# Patient Record
Sex: FEMALE | Race: ASIAN
[De-identification: names, ages, dates, MRNs, and addresses within clinical notes are randomized per-mention and may not be internally consistent; named-entity substitution may affect disease eponyms.]

---

## 2017-12-03 ENCOUNTER — HEALTH MAINTENANCE LETTER (OUTPATIENT)
Age: 47
End: 2017-12-03

## 2019-05-21 ENCOUNTER — OFFICE VISIT (OUTPATIENT)
Dept: FAMILY MEDICINE | Facility: CLINIC | Age: 49
End: 2019-05-21
Payer: COMMERCIAL

## 2019-05-21 VITALS
TEMPERATURE: 98.4 F | HEART RATE: 91 BPM | SYSTOLIC BLOOD PRESSURE: 116 MMHG | HEIGHT: 61 IN | BODY MASS INDEX: 22.09 KG/M2 | OXYGEN SATURATION: 97 % | DIASTOLIC BLOOD PRESSURE: 77 MMHG | WEIGHT: 117 LBS

## 2019-05-21 DIAGNOSIS — Z23 NEED FOR PROPHYLACTIC VACCINATION AGAINST DIPHTHERIA AND TETANUS: ICD-10-CM

## 2019-05-21 DIAGNOSIS — Z13.220 SCREENING CHOLESTEROL LEVEL: ICD-10-CM

## 2019-05-21 DIAGNOSIS — Z13.21 ENCOUNTER FOR VITAMIN DEFICIENCY SCREENING: ICD-10-CM

## 2019-05-21 DIAGNOSIS — J30.9 ALLERGIC RHINITIS, UNSPECIFIED SEASONALITY, UNSPECIFIED TRIGGER: Primary | ICD-10-CM

## 2019-05-21 DIAGNOSIS — Z13.1 SCREENING FOR DIABETES MELLITUS: ICD-10-CM

## 2019-05-21 LAB
CHOLEST SERPL-MCNC: 181 MG/DL (ref 0–200)
CHOLEST/HDLC SERPL: 3.8 {RATIO} (ref 0–5)
FASTING SPECIMEN: NO
HBA1C MFR BLD: 5.3 % (ref 4.1–5.7)
HDLC SERPL-MCNC: 48 MG/DL
LDLC SERPL CALC-MCNC: 111 MG/DL (ref 0–129)
TRIGL SERPL-MCNC: 113 MG/DL (ref 0–150)
VLDL-CHOLESTEROL: 23 (ref 7–32)

## 2019-05-21 RX ORDER — FLUTICASONE PROPIONATE 50 MCG
1 SPRAY, SUSPENSION (ML) NASAL 2 TIMES DAILY
Qty: 15.8 ML | Refills: 3 | Status: SHIPPED | OUTPATIENT
Start: 2019-05-21

## 2019-05-21 SDOH — HEALTH STABILITY: MENTAL HEALTH: HOW OFTEN DO YOU HAVE 6 OR MORE DRINKS ON ONE OCCASION?: NEVER

## 2019-05-21 SDOH — HEALTH STABILITY: MENTAL HEALTH: HOW OFTEN DO YOU HAVE A DRINK CONTAINING ALCOHOL?: MONTHLY OR LESS

## 2019-05-21 SDOH — HEALTH STABILITY: MENTAL HEALTH: HOW MANY STANDARD DRINKS CONTAINING ALCOHOL DO YOU HAVE ON A TYPICAL DAY?: 1 OR 2

## 2019-05-21 ASSESSMENT — PAIN SCALES - GENERAL: PAINLEVEL: NO PAIN (0)

## 2019-05-21 ASSESSMENT — MIFFLIN-ST. JEOR: SCORE: 1094.67

## 2019-05-21 NOTE — NURSING NOTE
"48 year old  Chief Complaint   Patient presents with     Establish Care     Throat Problem     felt something inside her throat, it can be dryness. It happened over a month.     Nasal Congestion       Blood pressure 116/77, pulse 91, temperature 98.4  F (36.9  C), temperature source Oral, height 1.552 m (5' 1.1\"), weight 53.1 kg (117 lb), last menstrual period 04/30/2019, SpO2 97 %. Body mass index is 22.03 kg/m .  Patient Active Problem List   Diagnosis     Disturbance of skin sensation       Wt Readings from Last 2 Encounters:   05/21/19 53.1 kg (117 lb)   08/19/16 53.6 kg (118 lb 2 oz)     BP Readings from Last 3 Encounters:   05/21/19 116/77   08/19/16 103/78         Current Outpatient Medications   Medication     Multiple Vitamin (MULTIVITAMINS PO)     Cyanocobalamin (B-12) 1000 MCG CAPS     No current facility-administered medications for this visit.        Social History     Tobacco Use     Smoking status: Never Smoker     Smokeless tobacco: Never Used   Substance Use Topics     Alcohol use: Yes     Comment: Several times a year     Drug use: No       Health Maintenance Due   Topic Date Due     PREVENTIVE CARE VISIT  1969     PAP  1969     HIV SCREENING  07/04/1984     DTAP/TDAP/TD IMMUNIZATION (1 - Tdap) 07/04/1994     PHQ-2  01/01/2019       No results found for: RITCHIE Rausch CMA  May 21, 2019 12:41 PM  "

## 2019-05-21 NOTE — LETTER
"                                      [facility]  [address]  [place] [address]  Phone: [phone]  Fax: [phone]       Date: May 22, 2019      Nick Stover  [address]  [place] [facility] [place] [address]        Nick,    Overall, your blood work looked very good.    Your Vitamin D level is normal (29 mcg/L).    Your blood sugar, measured by your hemoglobin A1c, was normal, 5.3%.    Your cholesterol is pretty good. Your total cholesterol (181) and LDL (111) were in a good range. Your HDL (the \"good\" cholesterol) could be a little bit higher than it currently is at 48. The best way to raise this level is through regular exercise, which I would recommend you start.    Please let me know if you have any questions.    It was good to meet you and your .    Nakul Patino  3:48 PM, May 22, 2019    Resulted Orders   Lipid Panel ([place])   Result Value Ref Range    FASTING SPECIMEN no     Cholesterol 181.0 0.0 - 200.0    HDL Cholesterol 48.0 (L) >50.0    Triglycerides 113.0 0.0 - 150.0    Cholesterol/HDL Ratio 3.8 0.0 - 5.0    LDL Cholesterol Direct 111.0 0.0 - 129.0    VLDL-Cholesterol 23.0 7.0 - 32.0   Hemoglobin A1c ([place])   Result Value Ref Range    Hemoglobin A1C 5.3 4.1 - 5.7 %   Vitamin D Deficiency   Result Value Ref Range    Vitamin D Deficiency screening 29 20 - 75 ug/L      Comment:      Season, race, dietary intake, and treatment affect the concentration of   25-hydroxy-Vitamin D. Values may decrease during winter months and increase   during summer months. Values 20-29 ug/L may indicate Vitamin D insufficiency   and values <20 ug/L may indicate Vitamin D deficiency.  Vitamin D determination is routinely performed by an immunoassay specific for   25 hydroxyvitamin D3.  If an individual is on vitamin D2 (ergocalciferol)   supplementation, please specify 25 OH vitamin D2 and D3 level determination by   LCMSMS test [id number].             "

## 2019-05-21 NOTE — PROGRESS NOTES
SUBJECTIVE:   Nick Escalona is a 48 year old female who presents to clinic today to establish care and to discuss the following problem(s).    - feeling that needs to clear her throat, itching in throat for the past 2 months  - white mucous from nose and when she spits  - feels that nose is dry  - very mild nasal congestion  - denies sinus pressure  - not as severe early in the morning, worse throughout the day  - no headaches  - no breathing difficulty and no coughing  - no rash/skin changes  - no difficulty swallowing solids or liquids  - not taking any medications for this    - has a pet cat for the past 2 years, has intermittent itchy eyes when clearing litter box    ROS: Denies fevers, chills, chest pain, difficulty breathing, abdominal pain    Past Medical History:   Diagnosis Date     Gestational diabetes      Past Surgical History:   Procedure Laterality Date      SECTION  2006    Placenta Previa     Family History   Problem Relation Age of Onset     Cancer Mother 25        Spleen Cancer     Gestational Diabetes Mother      Stomach Cancer Father 55     Asthma Paternal Grandmother      Heart Disease No family hx of      Breast Cancer No family hx of      Ovarian Cancer No family hx of      Colon Cancer No family hx of      Social History     Tobacco Use     Smoking status: Never Smoker     Smokeless tobacco: Never Used   Substance Use Topics     Alcohol use: Yes     Frequency: Monthly or less     Drinks per session: 1 or 2     Binge frequency: Never     Comment: Several times a year     Drug use: No     Social History     Social History Narrative    Spiritual/cultural practices: None specific        Support network: Friends from work,         Living situation: Lives with  and 2 daughters (one of whom is going to [employer] in the fall), she grew up in [place], moved to  [place] , her parents and siblings are still in [place]        Works in IT for [employer].       Current Outpatient  "Medications   Medication     fluticasone (FLONASE) 50 MCG/ACT nasal spray     Multiple Vitamin (MULTIVITAMINS PO)     No current facility-administered medications for this visit.      I have reviewed the patient's past medical, surgical, family, and social history.     OBJECTIVE:   /77 (BP Location: Left arm, Patient Position: Chair, Cuff Size: Adult Small)   Pulse 91   Temp 98.4  F (36.9  C) (Oral)   Ht 1.552 m (5' 1.1\")   Wt 53.1 kg (117 lb)   LMP 04/30/2019 (Approximate)   SpO2 97%   BMI 22.03 kg/m      Constitutional: well-appearing, appears stated age  Eyes: conjunctivae without erythema, sclera anicteric.   ENT: bilateral TM and external otic canals normal. Nasal mucosa pallor on left side. Posterior oropharynx normal.  Cardiac: regular rate and rhythm, normal S1/S2, no murmur/rubs/gallops  Respiratory: lungs clear to auscultation bilaterally, normal work of breathing, no wheezes/crackles  Skin: no rashes, lesions, or wounds  Psych: affect is full and appropriate, speech is fluent and non-pressured    ASSESSMENT AND PLAN:     (J30.9) Allergic rhinitis, unspecified seasonality, unspecified trigger  (primary encounter diagnosis)  Comment: History and exam consistent with allergic rhinitis as cause of her post-nasal drip and throat clearing. Encouraged hand and face washing after being outdoors, nasal saline irrigation. Start nasal fluticasone. I have asked that she call me in 2 weeks if not improving and we would discuss additional therapy. Next step would be to add either intranasal or oral antihistamine.   Plan: fluticasone (FLONASE) 50 MCG/ACT nasal spray          (Z13.220) Screening cholesterol level  Comment:   Plan: Lipid Panel ([place])          (Z13.1) Screening for diabetes mellitus  Comment:   Plan: Hemoglobin A1c ([place])          (Z23) Need for prophylactic vaccination against diphtheria and tetanus  Comment:   Plan: VACCINE ADMINISTRATION, INITIAL, TDAP VACCINE         ([place])   "        (Z13.21) Encounter for vitamin deficiency screening  Comment: Reports a history of treated vitamin D deficiency. Requests recheck which is reasonable.   Plan: Vitamin D Deficiency          Nakul Patino   [facility]  05/21/2019, 1:41 PM

## 2019-05-22 LAB — DEPRECATED CALCIDIOL+CALCIFEROL SERPL-MC: 29 UG/L (ref 20–75)

## 2024-11-05 ENCOUNTER — LAB REQUISITION (OUTPATIENT)
Dept: LAB | Facility: CLINIC | Age: 54
End: 2024-11-05
Payer: COMMERCIAL

## 2024-11-05 DIAGNOSIS — Z13.1 ENCOUNTER FOR SCREENING FOR DIABETES MELLITUS: ICD-10-CM

## 2024-11-05 DIAGNOSIS — Z12.4 ENCOUNTER FOR SCREENING FOR MALIGNANT NEOPLASM OF CERVIX: ICD-10-CM

## 2024-11-05 DIAGNOSIS — Z13.220 ENCOUNTER FOR SCREENING FOR LIPOID DISORDERS: ICD-10-CM

## 2024-11-05 DIAGNOSIS — Z13.29 ENCOUNTER FOR SCREENING FOR OTHER SUSPECTED ENDOCRINE DISORDER: ICD-10-CM

## 2024-11-05 LAB
EST. AVERAGE GLUCOSE BLD GHB EST-MCNC: 111 MG/DL
HBA1C MFR BLD: 5.5 %

## 2024-11-06 LAB
CHOLEST SERPL-MCNC: 238 MG/DL
FASTING STATUS PATIENT QL REPORTED: YES
HDLC SERPL-MCNC: 57 MG/DL
HPV HR 12 DNA CVX QL NAA+PROBE: NEGATIVE
HPV16 DNA CVX QL NAA+PROBE: NEGATIVE
HPV18 DNA CVX QL NAA+PROBE: NEGATIVE
HUMAN PAPILLOMA VIRUS FINAL DIAGNOSIS: NORMAL
LDLC SERPL CALC-MCNC: 167 MG/DL
NONHDLC SERPL-MCNC: 181 MG/DL
TRIGL SERPL-MCNC: 71 MG/DL
TSH SERPL DL<=0.005 MIU/L-ACNC: 2.59 UIU/ML (ref 0.3–4.2)

## 2024-11-08 LAB
BKR AP ASSOCIATED HPV REPORT: NORMAL
BKR LAB AP GYN ADEQUACY: NORMAL
BKR LAB AP GYN INTERPRETATION: NORMAL
BKR LAB AP LMP: NORMAL
BKR LAB AP PREVIOUS ABNL DX: NORMAL
BKR LAB AP PREVIOUS ABNORMAL: NORMAL
PATH REPORT.COMMENTS IMP SPEC: NORMAL
PATH REPORT.COMMENTS IMP SPEC: NORMAL
PATH REPORT.RELEVANT HX SPEC: NORMAL